# Patient Record
Sex: FEMALE | Race: WHITE | NOT HISPANIC OR LATINO | URBAN - METROPOLITAN AREA
[De-identification: names, ages, dates, MRNs, and addresses within clinical notes are randomized per-mention and may not be internally consistent; named-entity substitution may affect disease eponyms.]

---

## 2017-01-25 ENCOUNTER — INPATIENT (INPATIENT)
Facility: HOSPITAL | Age: 62
LOS: 4 days | Discharge: HOME | DRG: 312 | End: 2017-01-30
Attending: INTERNAL MEDICINE
Payer: MEDICARE

## 2017-01-25 VITALS
DIASTOLIC BLOOD PRESSURE: 78 MMHG | OXYGEN SATURATION: 93 % | RESPIRATION RATE: 18 BRPM | TEMPERATURE: 98 F | SYSTOLIC BLOOD PRESSURE: 160 MMHG | HEART RATE: 86 BPM

## 2017-01-25 DIAGNOSIS — F31.9 BIPOLAR DISORDER, UNSPECIFIED: ICD-10-CM

## 2017-01-25 DIAGNOSIS — R55 SYNCOPE AND COLLAPSE: ICD-10-CM

## 2017-01-25 DIAGNOSIS — E78.00 PURE HYPERCHOLESTEROLEMIA, UNSPECIFIED: ICD-10-CM

## 2017-01-25 DIAGNOSIS — I10 ESSENTIAL (PRIMARY) HYPERTENSION: ICD-10-CM

## 2017-01-25 PROBLEM — Z00.00 ENCOUNTER FOR PREVENTIVE HEALTH EXAMINATION: Status: ACTIVE | Noted: 2017-01-25

## 2017-01-25 LAB
ALBUMIN SERPL ELPH-MCNC: 3.9 G/DL — SIGNIFICANT CHANGE UP (ref 3.3–5.2)
ALP SERPL-CCNC: 69 U/L — SIGNIFICANT CHANGE UP (ref 40–120)
ALT FLD-CCNC: 31 U/L — SIGNIFICANT CHANGE UP
ANION GAP SERPL CALC-SCNC: 12 MMOL/L — SIGNIFICANT CHANGE UP (ref 5–17)
APPEARANCE UR: CLEAR — SIGNIFICANT CHANGE UP
APTT BLD: 27.9 SEC — SIGNIFICANT CHANGE UP (ref 27.5–37.4)
AST SERPL-CCNC: 31 U/L — SIGNIFICANT CHANGE UP
BASOPHILS # BLD AUTO: 0 K/UL — SIGNIFICANT CHANGE UP (ref 0–0.2)
BASOPHILS NFR BLD AUTO: 0.3 % — SIGNIFICANT CHANGE UP (ref 0–2)
BILIRUB SERPL-MCNC: 0.3 MG/DL — LOW (ref 0.4–2)
BILIRUB UR-MCNC: NEGATIVE — SIGNIFICANT CHANGE UP
BUN SERPL-MCNC: 19 MG/DL — SIGNIFICANT CHANGE UP (ref 8–20)
CALCIUM SERPL-MCNC: 9.8 MG/DL — SIGNIFICANT CHANGE UP (ref 8.6–10.2)
CHLORIDE SERPL-SCNC: 103 MMOL/L — SIGNIFICANT CHANGE UP (ref 98–107)
CK SERPL-CCNC: 63 U/L — SIGNIFICANT CHANGE UP (ref 25–170)
CO2 SERPL-SCNC: 29 MMOL/L — SIGNIFICANT CHANGE UP (ref 22–29)
COLOR SPEC: YELLOW — SIGNIFICANT CHANGE UP
CREAT SERPL-MCNC: 0.47 MG/DL — LOW (ref 0.5–1.3)
DIFF PNL FLD: NEGATIVE — SIGNIFICANT CHANGE UP
EOSINOPHIL # BLD AUTO: 0.2 K/UL — SIGNIFICANT CHANGE UP (ref 0–0.5)
EOSINOPHIL NFR BLD AUTO: 2.6 % — SIGNIFICANT CHANGE UP (ref 0–6)
EPI CELLS # UR: SIGNIFICANT CHANGE UP
GLUCOSE SERPL-MCNC: 92 MG/DL — SIGNIFICANT CHANGE UP (ref 70–115)
GLUCOSE UR QL: NEGATIVE MG/DL — SIGNIFICANT CHANGE UP
HCT VFR BLD CALC: 37.7 % — SIGNIFICANT CHANGE UP (ref 37–47)
HGB BLD-MCNC: 11.7 G/DL — LOW (ref 12–16)
INR BLD: 1.1 RATIO — SIGNIFICANT CHANGE UP (ref 0.88–1.16)
KETONES UR-MCNC: ABNORMAL
LEUKOCYTE ESTERASE UR-ACNC: NEGATIVE — SIGNIFICANT CHANGE UP
LYMPHOCYTES # BLD AUTO: 1.9 K/UL — SIGNIFICANT CHANGE UP (ref 1–4.8)
LYMPHOCYTES # BLD AUTO: 26 % — SIGNIFICANT CHANGE UP (ref 20–55)
MCHC RBC-ENTMCNC: 25.7 PG — LOW (ref 27–31)
MCHC RBC-ENTMCNC: 31 G/DL — LOW (ref 32–36)
MCV RBC AUTO: 82.7 FL — SIGNIFICANT CHANGE UP (ref 81–99)
MONOCYTES # BLD AUTO: 0.8 K/UL — SIGNIFICANT CHANGE UP (ref 0–0.8)
MONOCYTES NFR BLD AUTO: 11 % — HIGH (ref 3–10)
NEUTROPHILS # BLD AUTO: 4.3 K/UL — SIGNIFICANT CHANGE UP (ref 1.8–8)
NEUTROPHILS NFR BLD AUTO: 59.7 % — SIGNIFICANT CHANGE UP (ref 37–73)
NITRITE UR-MCNC: NEGATIVE — SIGNIFICANT CHANGE UP
PH UR: 5 — SIGNIFICANT CHANGE UP (ref 4.8–8)
PLATELET # BLD AUTO: 202 K/UL — SIGNIFICANT CHANGE UP (ref 150–400)
POTASSIUM SERPL-MCNC: 4.5 MMOL/L — SIGNIFICANT CHANGE UP (ref 3.5–5.3)
POTASSIUM SERPL-SCNC: 4.5 MMOL/L — SIGNIFICANT CHANGE UP (ref 3.5–5.3)
PROT SERPL-MCNC: 7.5 G/DL — SIGNIFICANT CHANGE UP (ref 6.6–8.7)
PROT UR-MCNC: 15 MG/DL
PROTHROM AB SERPL-ACNC: 12.1 SEC — SIGNIFICANT CHANGE UP (ref 10–13.1)
RBC # BLD: 4.56 M/UL — SIGNIFICANT CHANGE UP (ref 4.4–5.2)
RBC # FLD: 16.2 % — HIGH (ref 11–15.6)
SODIUM SERPL-SCNC: 144 MMOL/L — SIGNIFICANT CHANGE UP (ref 135–145)
SP GR SPEC: 1.02 — SIGNIFICANT CHANGE UP (ref 1.01–1.02)
TROPONIN T SERPL-MCNC: <0.01 NG/ML — SIGNIFICANT CHANGE UP (ref 0–0.06)
UROBILINOGEN FLD QL: 1 MG/DL
VALPROATE SERPL-MCNC: 69.9 UG/ML — SIGNIFICANT CHANGE UP (ref 50–100)
WBC # BLD: 7.18 K/UL — SIGNIFICANT CHANGE UP (ref 4.8–10.8)
WBC # FLD AUTO: 7.18 K/UL — SIGNIFICANT CHANGE UP (ref 4.8–10.8)
WBC UR QL: SIGNIFICANT CHANGE UP

## 2017-01-25 PROCEDURE — 99223 1ST HOSP IP/OBS HIGH 75: CPT | Mod: AI

## 2017-01-25 PROCEDURE — 73562 X-RAY EXAM OF KNEE 3: CPT | Mod: 26,50

## 2017-01-25 PROCEDURE — 70450 CT HEAD/BRAIN W/O DYE: CPT | Mod: 26

## 2017-01-25 PROCEDURE — 99285 EMERGENCY DEPT VISIT HI MDM: CPT

## 2017-01-25 PROCEDURE — 93010 ELECTROCARDIOGRAM REPORT: CPT

## 2017-01-25 PROCEDURE — 71010: CPT | Mod: 26

## 2017-01-25 RX ORDER — TIOTROPIUM BROMIDE 18 UG/1
1 CAPSULE ORAL; RESPIRATORY (INHALATION) DAILY
Qty: 0 | Refills: 0 | Status: DISCONTINUED | OUTPATIENT
Start: 2017-01-25 | End: 2017-01-30

## 2017-01-25 RX ORDER — ACETAMINOPHEN 500 MG
650 TABLET ORAL EVERY 8 HOURS
Qty: 0 | Refills: 0 | Status: DISCONTINUED | OUTPATIENT
Start: 2017-01-25 | End: 2017-01-30

## 2017-01-25 RX ORDER — FUROSEMIDE 40 MG
20 TABLET ORAL DAILY
Qty: 0 | Refills: 0 | Status: DISCONTINUED | OUTPATIENT
Start: 2017-01-25 | End: 2017-01-30

## 2017-01-25 RX ORDER — LORATADINE 10 MG/1
10 TABLET ORAL DAILY
Qty: 0 | Refills: 0 | Status: DISCONTINUED | OUTPATIENT
Start: 2017-01-25 | End: 2017-01-30

## 2017-01-25 RX ORDER — SERTRALINE 25 MG/1
100 TABLET, FILM COATED ORAL DAILY
Qty: 0 | Refills: 0 | Status: DISCONTINUED | OUTPATIENT
Start: 2017-01-25 | End: 2017-01-30

## 2017-01-25 RX ORDER — SODIUM CHLORIDE 9 MG/ML
3 INJECTION INTRAMUSCULAR; INTRAVENOUS; SUBCUTANEOUS ONCE
Qty: 0 | Refills: 0 | Status: COMPLETED | OUTPATIENT
Start: 2017-01-25 | End: 2017-01-25

## 2017-01-25 RX ORDER — SODIUM CHLORIDE 9 MG/ML
1000 INJECTION INTRAMUSCULAR; INTRAVENOUS; SUBCUTANEOUS
Qty: 0 | Refills: 0 | Status: COMPLETED | OUTPATIENT
Start: 2017-01-25 | End: 2017-01-25

## 2017-01-25 RX ORDER — HEPARIN SODIUM 5000 [USP'U]/ML
5000 INJECTION INTRAVENOUS; SUBCUTANEOUS EVERY 12 HOURS
Qty: 0 | Refills: 0 | Status: DISCONTINUED | OUTPATIENT
Start: 2017-01-25 | End: 2017-01-28

## 2017-01-25 RX ORDER — RISPERIDONE 4 MG/1
1 TABLET ORAL AT BEDTIME
Qty: 0 | Refills: 0 | Status: DISCONTINUED | OUTPATIENT
Start: 2017-01-25 | End: 2017-01-30

## 2017-01-25 RX ORDER — BENZTROPINE MESYLATE 1 MG
0.5 TABLET ORAL
Qty: 0 | Refills: 0 | Status: DISCONTINUED | OUTPATIENT
Start: 2017-01-25 | End: 2017-01-30

## 2017-01-25 RX ORDER — AMLODIPINE BESYLATE 2.5 MG/1
5 TABLET ORAL DAILY
Qty: 0 | Refills: 0 | Status: DISCONTINUED | OUTPATIENT
Start: 2017-01-25 | End: 2017-01-30

## 2017-01-25 RX ORDER — OXYBUTYNIN CHLORIDE 5 MG
10 TABLET ORAL DAILY
Qty: 0 | Refills: 0 | Status: DISCONTINUED | OUTPATIENT
Start: 2017-01-25 | End: 2017-01-30

## 2017-01-25 RX ORDER — ALBUTEROL 90 UG/1
2 AEROSOL, METERED ORAL EVERY 6 HOURS
Qty: 0 | Refills: 0 | Status: DISCONTINUED | OUTPATIENT
Start: 2017-01-25 | End: 2017-01-30

## 2017-01-25 RX ORDER — ACETAMINOPHEN 500 MG
650 TABLET ORAL EVERY 6 HOURS
Qty: 0 | Refills: 0 | Status: DISCONTINUED | OUTPATIENT
Start: 2017-01-25 | End: 2017-01-30

## 2017-01-25 RX ORDER — DIVALPROEX SODIUM 500 MG/1
500 TABLET, DELAYED RELEASE ORAL
Qty: 0 | Refills: 0 | Status: DISCONTINUED | OUTPATIENT
Start: 2017-01-25 | End: 2017-01-30

## 2017-01-25 RX ORDER — LEVETIRACETAM 250 MG/1
500 TABLET, FILM COATED ORAL
Qty: 0 | Refills: 0 | Status: DISCONTINUED | OUTPATIENT
Start: 2017-01-25 | End: 2017-01-30

## 2017-01-25 RX ADMIN — SODIUM CHLORIDE 3 MILLILITER(S): 9 INJECTION INTRAMUSCULAR; INTRAVENOUS; SUBCUTANEOUS at 12:53

## 2017-01-25 RX ADMIN — SODIUM CHLORIDE 125 MILLILITER(S): 9 INJECTION INTRAMUSCULAR; INTRAVENOUS; SUBCUTANEOUS at 12:54

## 2017-01-25 RX ADMIN — RISPERIDONE 1 MILLIGRAM(S): 4 TABLET ORAL at 23:15

## 2017-01-25 NOTE — ED ADULT NURSE NOTE - OBJECTIVE STATEMENT
pt YEVGENIY from [facility]. Pt with a c/o multiple falls at the residence, pt denies head injury or loc. pt A&Ox3 able to ambulate and skin intact. Iv placed and labs obtained. will continue to monitor.

## 2017-01-25 NOTE — ED PROVIDER NOTE - OBJECTIVE STATEMENT
60 y/o female in ED c/o feeling lightheaded, dizzy and weak leading to fall at NH today.  as per EMS, pt found on floor.  pt denies any LOC, head trauma, neck pain, cp, sob, n/v/d/abd pain.  no sick contacts or recent travel - poor historian

## 2017-01-25 NOTE — ED ADULT NURSE NOTE - CHIEF COMPLAINT QUOTE
pt comes to ed from [place] for eval of multiple falls over the last week. denies hitting head/loc. unk. meds

## 2017-01-25 NOTE — H&P ADULT. - HISTORY OF PRESENT ILLNESS
61 yr old female with hypertension, hyperlipidemia, bipolar was sent from LTC for weakness and fall. Patient states she uses a cane and occasionally a walker and has been falling lately. Not a good historian. States she had occasional dizziness. Denies chest pain, shortness of breath, headache. States she fell today on her knees, no LOC or head trauma. Has been in LTC for 8 months. Had vomiting 2 days ago, now resolved. No bladder/bowel complaints. Normal appetite.

## 2017-01-25 NOTE — ED ADULT TRIAGE NOTE - CHIEF COMPLAINT QUOTE
pt comes to ed from [place] for eval of multiple falls over the last week. denies hitting head/loc. unk. meds

## 2017-01-26 LAB
ANION GAP SERPL CALC-SCNC: 14 MMOL/L — SIGNIFICANT CHANGE UP (ref 5–17)
BASOPHILS # BLD AUTO: 0 K/UL — SIGNIFICANT CHANGE UP (ref 0–0.2)
BASOPHILS NFR BLD AUTO: 0.1 % — SIGNIFICANT CHANGE UP (ref 0–2)
BUN SERPL-MCNC: 22 MG/DL — HIGH (ref 8–20)
CALCIUM SERPL-MCNC: 9.4 MG/DL — SIGNIFICANT CHANGE UP (ref 8.6–10.2)
CHLORIDE SERPL-SCNC: 102 MMOL/L — SIGNIFICANT CHANGE UP (ref 98–107)
CO2 SERPL-SCNC: 25 MMOL/L — SIGNIFICANT CHANGE UP (ref 22–29)
CREAT SERPL-MCNC: 0.43 MG/DL — LOW (ref 0.5–1.3)
EOSINOPHIL # BLD AUTO: 0.2 K/UL — SIGNIFICANT CHANGE UP (ref 0–0.5)
EOSINOPHIL NFR BLD AUTO: 3.4 % — SIGNIFICANT CHANGE UP (ref 0–6)
GLUCOSE SERPL-MCNC: 90 MG/DL — SIGNIFICANT CHANGE UP (ref 70–115)
HCT VFR BLD CALC: 35.3 % — LOW (ref 37–47)
HGB BLD-MCNC: 11 G/DL — LOW (ref 12–16)
INR BLD: 1.11 RATIO — SIGNIFICANT CHANGE UP (ref 0.88–1.16)
LYMPHOCYTES # BLD AUTO: 2.7 K/UL — SIGNIFICANT CHANGE UP (ref 1–4.8)
LYMPHOCYTES # BLD AUTO: 36.9 % — SIGNIFICANT CHANGE UP (ref 20–55)
MAGNESIUM SERPL-MCNC: 2 MG/DL — SIGNIFICANT CHANGE UP (ref 1.8–2.5)
MCHC RBC-ENTMCNC: 25.6 PG — LOW (ref 27–31)
MCHC RBC-ENTMCNC: 31.2 G/DL — LOW (ref 32–36)
MCV RBC AUTO: 82.3 FL — SIGNIFICANT CHANGE UP (ref 81–99)
MONOCYTES # BLD AUTO: 0.8 K/UL — SIGNIFICANT CHANGE UP (ref 0–0.8)
MONOCYTES NFR BLD AUTO: 10.3 % — HIGH (ref 3–10)
NEUTROPHILS # BLD AUTO: 3.6 K/UL — SIGNIFICANT CHANGE UP (ref 1.8–8)
NEUTROPHILS NFR BLD AUTO: 48.6 % — SIGNIFICANT CHANGE UP (ref 37–73)
PHOSPHATE SERPL-MCNC: 3.5 MG/DL — SIGNIFICANT CHANGE UP (ref 2.4–4.7)
PLATELET # BLD AUTO: 198 K/UL — SIGNIFICANT CHANGE UP (ref 150–400)
POTASSIUM SERPL-MCNC: 3.9 MMOL/L — SIGNIFICANT CHANGE UP (ref 3.5–5.3)
POTASSIUM SERPL-SCNC: 3.9 MMOL/L — SIGNIFICANT CHANGE UP (ref 3.5–5.3)
PROTHROM AB SERPL-ACNC: 12.2 SEC — SIGNIFICANT CHANGE UP (ref 10–13.1)
RBC # BLD: 4.29 M/UL — LOW (ref 4.4–5.2)
RBC # FLD: 16.1 % — HIGH (ref 11–15.6)
SODIUM SERPL-SCNC: 141 MMOL/L — SIGNIFICANT CHANGE UP (ref 135–145)
WBC # BLD: 7.35 K/UL — SIGNIFICANT CHANGE UP (ref 4.8–10.8)
WBC # FLD AUTO: 7.35 K/UL — SIGNIFICANT CHANGE UP (ref 4.8–10.8)

## 2017-01-26 PROCEDURE — 99232 SBSQ HOSP IP/OBS MODERATE 35: CPT

## 2017-01-26 RX ADMIN — DIVALPROEX SODIUM 500 MILLIGRAM(S): 500 TABLET, DELAYED RELEASE ORAL at 18:22

## 2017-01-26 RX ADMIN — ALBUTEROL 2 PUFF(S): 90 AEROSOL, METERED ORAL at 02:58

## 2017-01-26 RX ADMIN — Medication 0.5 MILLIGRAM(S): at 05:24

## 2017-01-26 RX ADMIN — RISPERIDONE 1 MILLIGRAM(S): 4 TABLET ORAL at 22:03

## 2017-01-26 RX ADMIN — ALBUTEROL 2 PUFF(S): 90 AEROSOL, METERED ORAL at 10:19

## 2017-01-26 RX ADMIN — Medication 20 MILLIGRAM(S): at 05:25

## 2017-01-26 RX ADMIN — AMLODIPINE BESYLATE 5 MILLIGRAM(S): 2.5 TABLET ORAL at 05:25

## 2017-01-26 RX ADMIN — SERTRALINE 100 MILLIGRAM(S): 25 TABLET, FILM COATED ORAL at 13:56

## 2017-01-26 RX ADMIN — Medication 1 APPLICATION(S): at 05:25

## 2017-01-26 RX ADMIN — Medication 0.5 MILLIGRAM(S): at 18:22

## 2017-01-26 RX ADMIN — LEVETIRACETAM 500 MILLIGRAM(S): 250 TABLET, FILM COATED ORAL at 05:24

## 2017-01-26 RX ADMIN — Medication 1 APPLICATION(S): at 22:03

## 2017-01-26 RX ADMIN — Medication 10 MILLIGRAM(S): at 13:58

## 2017-01-26 RX ADMIN — Medication 10 MILLIGRAM(S): at 05:25

## 2017-01-26 RX ADMIN — HEPARIN SODIUM 5000 UNIT(S): 5000 INJECTION INTRAVENOUS; SUBCUTANEOUS at 18:24

## 2017-01-26 RX ADMIN — TIOTROPIUM BROMIDE 1 CAPSULE(S): 18 CAPSULE ORAL; RESPIRATORY (INHALATION) at 10:18

## 2017-01-26 RX ADMIN — HEPARIN SODIUM 5000 UNIT(S): 5000 INJECTION INTRAVENOUS; SUBCUTANEOUS at 05:25

## 2017-01-26 RX ADMIN — LORATADINE 10 MILLIGRAM(S): 10 TABLET ORAL at 13:56

## 2017-01-26 RX ADMIN — DIVALPROEX SODIUM 500 MILLIGRAM(S): 500 TABLET, DELAYED RELEASE ORAL at 05:25

## 2017-01-26 RX ADMIN — LEVETIRACETAM 500 MILLIGRAM(S): 250 TABLET, FILM COATED ORAL at 18:22

## 2017-01-26 NOTE — PHYSICAL THERAPY INITIAL EVALUATION ADULT - ADDITIONAL COMMENTS
Pt is a poor historian, reports living in an Adult Home, no steps.  PTA, reports amb with a SAC, and requiring assist with dressing and showering.

## 2017-01-26 NOTE — PROGRESS NOTE ADULT - SUBJECTIVE AND OBJECTIVE BOX
INTERVAL HPI/OVERNIGHT EVENTS:      CC: fall, hypertension, bipolar disorder    No overnight events, denies complaints.     Vital Signs Last 24 Hrs  T(C): 36.9, Max: 36.9 ( @ 10:05)  T(F): 98.4, Max: 98.4 ( @ 10:05)  HR: 85 (76 - 85)  BP: 130/80 (130/80 - 152/74)  BP(mean): --  RR: 18 (16 - 20)  SpO2: 96% (95% - 100%)    PHYSICAL EXAM:    GENERAL: Not in distress  CHEST/LUNG: b/l air entry  HEART: Regular   ABDOMEN: Soft, BS +  EXTREMITIES:  no edema    MEDICATIONS  (STANDING):  sodium chloride 0.9%. 1000milliLiter(s) IV Continuous <Continuous>  heparin  Injectable 5000Unit(s) SubCutaneous every 12 hours  enalapril 10milliGRAM(s) Oral daily  diVALproex ER 500milliGRAM(s) Oral two times a day  levETIRAcetam 500milliGRAM(s) Oral two times a day  sertraline 100milliGRAM(s) Oral daily  loratadine 10milliGRAM(s) Oral daily  benztropine 0.5milliGRAM(s) Oral two times a day  risperiDONE   Tablet 1milliGRAM(s) Oral at bedtime  ALBUTerol    90 MICROgram(s) HFA Inhaler 2Puff(s) Inhalation every 6 hours  tiotropium 18 MICROgram(s) Capsule 1Capsule(s) Inhalation daily  amLODIPine   Tablet 5milliGRAM(s) Oral daily  clotrimazole 1% Cream 1Application(s) Topical two times a day  furosemide    Tablet 20milliGRAM(s) Oral daily  oxybutynin 10milliGRAM(s) Oral daily    MEDICATIONS  (PRN):  acetaminophen   Tablet 650milliGRAM(s) Oral every 8 hours PRN For Temp greater than 38 C (100.4 F)  acetaminophen   Tablet. 650milliGRAM(s) Oral every 6 hours PRN Moderate Pain (4 - 6)      Allergies    No Known Allergies    Intolerances          LABS:                          11.0   7.35  )-----------( 198      ( 2017 05:43 )             35.3     2017 05:43    141    |  102    |  22.0   ----------------------------<  90     3.9     |  25.0   |  0.43     Ca    9.4        2017 05:43  Phos  3.5       2017 05:43  Mg     2.0       2017 05:43    TPro  7.5    /  Alb  3.9    /  TBili  0.3    /  DBili  x      /  AST  31     /  ALT  31     /  AlkPhos  69     2017 12:48    PT/INR - ( 2017 05:43 )   PT: 12.2 sec;   INR: 1.11 ratio         PTT - ( 2017 12:48 )  PTT:27.9 sec  Urinalysis Basic - ( 2017 12:46 )    Color: Yellow / Appearance: Clear / S.025 / pH: x  Gluc: x / Ketone: Trace  / Bili: Negative / Urobili: 1 mg/dL   Blood: x / Protein: 15 mg/dL / Nitrite: Negative   Leuk Esterase: Negative / RBC: x / WBC 0-2   Sq Epi: x / Non Sq Epi: Occasional / Bacteria: x        RADIOLOGY & ADDITIONAL TESTS:

## 2017-01-26 NOTE — PHYSICAL THERAPY INITIAL EVALUATION ADULT - IMPAIRMENTS FOUND, PT EVAL
muscle strength/gait, locomotion, and balance/gross motor/arousal, attention, and cognition/anthropometric characteristics

## 2017-01-26 NOTE — PROGRESS NOTE ADULT - ASSESSMENT
81 yr old female with hypertension, hyperlipidemia, bipolar was sent from LTC for weakness and fall. Labs and imaging reviewed. She has severe arthritis of both knees.

## 2017-01-27 PROCEDURE — 99232 SBSQ HOSP IP/OBS MODERATE 35: CPT

## 2017-01-27 RX ADMIN — HEPARIN SODIUM 5000 UNIT(S): 5000 INJECTION INTRAVENOUS; SUBCUTANEOUS at 05:54

## 2017-01-27 RX ADMIN — AMLODIPINE BESYLATE 5 MILLIGRAM(S): 2.5 TABLET ORAL at 05:53

## 2017-01-27 RX ADMIN — Medication 0.5 MILLIGRAM(S): at 19:05

## 2017-01-27 RX ADMIN — Medication 10 MILLIGRAM(S): at 05:53

## 2017-01-27 RX ADMIN — DIVALPROEX SODIUM 500 MILLIGRAM(S): 500 TABLET, DELAYED RELEASE ORAL at 19:03

## 2017-01-27 RX ADMIN — Medication 20 MILLIGRAM(S): at 05:53

## 2017-01-27 RX ADMIN — ALBUTEROL 2 PUFF(S): 90 AEROSOL, METERED ORAL at 22:25

## 2017-01-27 RX ADMIN — Medication 1 APPLICATION(S): at 05:57

## 2017-01-27 RX ADMIN — LEVETIRACETAM 500 MILLIGRAM(S): 250 TABLET, FILM COATED ORAL at 19:03

## 2017-01-27 RX ADMIN — Medication 10 MILLIGRAM(S): at 12:34

## 2017-01-27 RX ADMIN — LORATADINE 10 MILLIGRAM(S): 10 TABLET ORAL at 12:34

## 2017-01-27 RX ADMIN — Medication 1 APPLICATION(S): at 19:04

## 2017-01-27 RX ADMIN — TIOTROPIUM BROMIDE 1 CAPSULE(S): 18 CAPSULE ORAL; RESPIRATORY (INHALATION) at 09:12

## 2017-01-27 RX ADMIN — DIVALPROEX SODIUM 500 MILLIGRAM(S): 500 TABLET, DELAYED RELEASE ORAL at 05:53

## 2017-01-27 RX ADMIN — ALBUTEROL 2 PUFF(S): 90 AEROSOL, METERED ORAL at 16:23

## 2017-01-27 RX ADMIN — ALBUTEROL 2 PUFF(S): 90 AEROSOL, METERED ORAL at 03:58

## 2017-01-27 RX ADMIN — ALBUTEROL 2 PUFF(S): 90 AEROSOL, METERED ORAL at 09:11

## 2017-01-27 RX ADMIN — SERTRALINE 100 MILLIGRAM(S): 25 TABLET, FILM COATED ORAL at 12:34

## 2017-01-27 RX ADMIN — RISPERIDONE 1 MILLIGRAM(S): 4 TABLET ORAL at 23:49

## 2017-01-27 RX ADMIN — LEVETIRACETAM 500 MILLIGRAM(S): 250 TABLET, FILM COATED ORAL at 05:53

## 2017-01-27 RX ADMIN — Medication 0.5 MILLIGRAM(S): at 05:53

## 2017-01-27 RX ADMIN — HEPARIN SODIUM 5000 UNIT(S): 5000 INJECTION INTRAVENOUS; SUBCUTANEOUS at 19:04

## 2017-01-27 NOTE — PROGRESS NOTE ADULT - SUBJECTIVE AND OBJECTIVE BOX
INTERVAL HPI/OVERNIGHT EVENTS:      CC: Falls, hypertension, bipolar    No overnight events, denies complaints. Evaluated by SAMEER.     Vital Signs Last 24 Hrs  T(C): 37, Max: 37 ( @ 16:30)  T(F): 98.6, Max: 98.6 ( @ 16:30)  HR: 76 (76 - 86)  BP: 145/82 (123/70 - 145/82)  BP(mean): --  RR: 20 (18 - 20)  SpO2: 94% (94% - 95%)    PHYSICAL EXAM:    GENERAL: alert, in no distress  CHEST/LUNG: b/l air entry  HEART: regular  ABDOMEN: soft, BS+  EXTREMITIES:  No edema    MEDICATIONS  (STANDING):  sodium chloride 0.9%. 1000milliLiter(s) IV Continuous <Continuous>  heparin  Injectable 5000Unit(s) SubCutaneous every 12 hours  enalapril 10milliGRAM(s) Oral daily  diVALproex ER 500milliGRAM(s) Oral two times a day  levETIRAcetam 500milliGRAM(s) Oral two times a day  sertraline 100milliGRAM(s) Oral daily  loratadine 10milliGRAM(s) Oral daily  benztropine 0.5milliGRAM(s) Oral two times a day  risperiDONE   Tablet 1milliGRAM(s) Oral at bedtime  ALBUTerol    90 MICROgram(s) HFA Inhaler 2Puff(s) Inhalation every 6 hours  tiotropium 18 MICROgram(s) Capsule 1Capsule(s) Inhalation daily  amLODIPine   Tablet 5milliGRAM(s) Oral daily  clotrimazole 1% Cream 1Application(s) Topical two times a day  furosemide    Tablet 20milliGRAM(s) Oral daily  oxybutynin 10milliGRAM(s) Oral daily    MEDICATIONS  (PRN):  acetaminophen   Tablet 650milliGRAM(s) Oral every 8 hours PRN For Temp greater than 38 C (100.4 F)  acetaminophen   Tablet. 650milliGRAM(s) Oral every 6 hours PRN Moderate Pain (4 - 6)      Allergies    No Known Allergies    Intolerances          LABS:                          11.0   7.35  )-----------( 198      ( 2017 05:43 )             35.3     2017 05:43    141    |  102    |  22.0   ----------------------------<  90     3.9     |  25.0   |  0.43     Ca    9.4        2017 05:43  Phos  3.5       2017 05:43  Mg     2.0       2017 05:43    TPro  7.5    /  Alb  3.9    /  TBili  0.3    /  DBili  x      /  AST  31     /  ALT  31     /  AlkPhos  69     2017 12:48    PT/INR - ( 2017 05:43 )   PT: 12.2 sec;   INR: 1.11 ratio         PTT - ( 2017 12:48 )  PTT:27.9 sec  Urinalysis Basic - ( 2017 12:46 )    Color: Yellow / Appearance: Clear / S.025 / pH: x  Gluc: x / Ketone: Trace  / Bili: Negative / Urobili: 1 mg/dL   Blood: x / Protein: 15 mg/dL / Nitrite: Negative   Leuk Esterase: Negative / RBC: x / WBC 0-2   Sq Epi: x / Non Sq Epi: Occasional / Bacteria: x        RADIOLOGY & ADDITIONAL TESTS:

## 2017-01-27 NOTE — DISCHARGE NOTE ADULT - PROVIDER TOKENS
FREE:[LAST:[PMD],PHONE:[(   )    -],FAX:[(   )    -]] FREE:[LAST:[PMD [facility]],PHONE:[(   )    -],FAX:[(   )    -],ADDRESS:[[facility] [phone] located on [address] [place] [address].]]

## 2017-01-27 NOTE — DISCHARGE NOTE ADULT - CARE PLAN
Principal Discharge DX:	Near syncope  Goal:	Avoid recurrent episodes.  Instructions for follow-up, activity and diet:	SAMEER, pain control for arthritis.  Secondary Diagnosis:	Bipolar 1 disorder  Instructions for follow-up, activity and diet:	Continue medications.  Secondary Diagnosis:	High cholesterol  Instructions for follow-up, activity and diet:	Continue statin  Secondary Diagnosis:	HTN (hypertension)  Instructions for follow-up, activity and diet:	Continue medications. Principal Discharge DX:	Near syncope  Goal:	Avoid recurrent episodes.  Instructions for follow-up, activity and diet:	SAMEER, pain control for arthritis.  Secondary Diagnosis:	Bipolar 1 disorder  Instructions for follow-up, activity and diet:	Continue medications.  Secondary Diagnosis:	High cholesterol  Instructions for follow-up, activity and diet:	Continue statin  Secondary Diagnosis:	HTN (hypertension)  Instructions for follow-up, activity and diet:	Continue medications.  Secondary Diagnosis:	Other secondary osteoarthritis of both knees

## 2017-01-27 NOTE — DISCHARGE NOTE ADULT - HOSPITAL COURSE
81 yr old female with hypertension, hyperlipidemia, bipolar was sent from LTC for weakness and fall. Labs and imaging reviewed. She has severe arthritis of both knees. PT evaluation was done, advised SAMEER. Discussed with patient, in agreement. Social work evaluation. 81 yr old female with hypertension, hyperlipidemia, bipolar was sent from LT for weakness and fall. Labs and imaging reviewed. She has severe arthritis of both knees. PT evaluation was done, advised SAMEER. Medically stable and agreeable with discharge and follow up plan. Patient was advised to return to ED if any symptoms occur or worsen.

## 2017-01-27 NOTE — DISCHARGE NOTE ADULT - PATIENT PORTAL LINK FT
“You can access the Follow[facility] Patient Portal, offered by [facility], by registering with the following website: [email or web address]”

## 2017-01-27 NOTE — DISCHARGE NOTE ADULT - MEDICATION SUMMARY - MEDICATIONS TO TAKE
I will START or STAY ON the medications listed below when I get home from the hospital:    Tylenol 325 mg oral tablet  -- 1 tab(s) by mouth 3 times a day, As Needed  -- Indication: For pain    enalapril 5 mg oral tablet  -- 2 tab(s) by mouth once a day  -- Indication: For Hypertension    Depakote  mg oral tablet, extended release  -- 2 tab(s) by mouth 2 times a day  -- Indication: For Bipolar 1 disorder    Keppra 500 mg oral tablet  -- 1 tab(s) by mouth 2 times a day  -- Indication: For Seizure    Zoloft 100 mg oral tablet  -- 1 tab(s) by mouth once a day  -- Indication: For Bipolar 1 disorder    loratadine 10 mg oral tablet  -- 1 tab(s) by mouth once a day  -- Indication: For allergy    Pravachol 40 mg oral tablet  -- 1 tab(s) by mouth once a day  -- Indication: For High cholesterol    benztropine 0.5 mg oral tablet  -- 1 tab(s) by mouth 2 times a day  -- Indication: For Bipolar 1 disorder    risperiDONE 1 mg oral tablet  -- 1 tab(s) by mouth once a day (at bedtime)  -- Indication: For Bipolar 1 disorder    Invega Sustenna 234 mg/1.5 mL intramuscular suspension, extended release  -- 234 milligram(s) intramuscular every 30 days  -- Indication: For Bipolar 1 disorder    Combivent Respimat CFC free 20 mcg-100 mcg/inh inhalation aerosol  -- 1 puff(s) inhaled 4 times a day  -- Indication: For Shortness of breath    amLODIPine 5 mg oral tablet  -- 1 tab(s) by mouth once a day  -- Indication: For Hypertension    clotrimazole 1% topical cream  -- Apply on skin to affected area 2 times a day  -- Indication: For rash    furosemide 20 mg oral tablet  -- 1 tab(s) by mouth once a day  -- Indication: For HTN (hypertension)    famotidine 20 mg oral tablet  -- 1 tab(s) by mouth 2 times a day  -- Indication: For reflux    Ditropan XL 10 mg/24 hr oral tablet, extended release  -- 1 tab(s) by mouth once a day  -- Indication: For urinary retention I will START or STAY ON the medications listed below when I get home from the hospital:    Tylenol 325 mg oral tablet  -- 1 tab(s) by mouth 3 times a day, As Needed  -- Indication: For pain    traMADol 50 mg oral tablet  -- 1 tab(s) by mouth 3 times a day, As Needed  -- Indication: For mod pain    enalapril 5 mg oral tablet  -- 2 tab(s) by mouth once a day  -- Indication: For Hypertension    Depakote  mg oral tablet, extended release  -- 2 tab(s) by mouth 2 times a day  -- Indication: For Bipolar 1 disorder    Keppra 500 mg oral tablet  -- 1 tab(s) by mouth 2 times a day  -- Indication: For Seizure    Zoloft 100 mg oral tablet  -- 1 tab(s) by mouth once a day  -- Indication: For Bipolar 1 disorder    loratadine 10 mg oral tablet  -- 1 tab(s) by mouth once a day  -- Indication: For allergy    Pravachol 40 mg oral tablet  -- 1 tab(s) by mouth once a day  -- Indication: For High cholesterol    benztropine 0.5 mg oral tablet  -- 1 tab(s) by mouth 2 times a day  -- Indication: For Bipolar 1 disorder    risperiDONE 1 mg oral tablet  -- 1 tab(s) by mouth once a day (at bedtime)  -- Indication: For Bipolar 1 disorder    Invega Sustenna 234 mg/1.5 mL intramuscular suspension, extended release  -- 234 milligram(s) intramuscular every 30 days  -- Indication: For Bipolar 1 disorder    Combivent Respimat CFC free 20 mcg-100 mcg/inh inhalation aerosol  -- 1 puff(s) inhaled 4 times a day  -- Indication: For Shortness of breath    amLODIPine 5 mg oral tablet  -- 1 tab(s) by mouth once a day  -- Indication: For Hypertension    clotrimazole 1% topical cream  -- Apply on skin to affected area 2 times a day  -- Indication: For rash    lidocaine 5% topical film  -- Apply on skin to affected area once a day  -- Indication: For pain    furosemide 20 mg oral tablet  -- 1 tab(s) by mouth once a day  -- Indication: For HTN (hypertension)    famotidine 20 mg oral tablet  -- 1 tab(s) by mouth 2 times a day  -- Indication: For reflux    polyethylene glycol 3350 oral powder for reconstitution  -- 17 gram(s) by mouth once a day  -- Indication: For Bowelr egimen    senna oral tablet  -- 2 tab(s) by mouth once a day (at bedtime)  -- Indication: For Bowel regimen    pantoprazole 40 mg oral delayed release tablet  -- 1 tab(s) by mouth once a day (before a meal)  -- Indication: For gerd    Ditropan XL 10 mg/24 hr oral tablet, extended release  -- 1 tab(s) by mouth once a day  -- Indication: For urinary retention

## 2017-01-27 NOTE — DISCHARGE NOTE ADULT - CARE PROVIDER_API CALL
BORA,   Phone: (   )    -  Fax: (   )    - PMD [facility],   [facility] [phone] located on [address] [place] [address], [place] [address].  Phone: (   )    -  Fax: (   )    -

## 2017-01-27 NOTE — PROGRESS NOTE ADULT - ASSESSMENT
81 yr old female with hypertension, hyperlipidemia, bipolar was sent from LTC for weakness and fall. Labs and imaging reviewed. She has severe arthritis of both knees. PT evaluation was done, advised SAMEER. Discussed with patient, in agreement. Social work evaluation.

## 2017-01-28 PROCEDURE — 99233 SBSQ HOSP IP/OBS HIGH 50: CPT

## 2017-01-28 RX ORDER — RISPERIDONE 4 MG/1
1 TABLET ORAL
Qty: 0 | Refills: 0 | COMMUNITY

## 2017-01-28 RX ORDER — DEXTROSE 50 % IN WATER 50 %
12.5 SYRINGE (ML) INTRAVENOUS ONCE
Qty: 0 | Refills: 0 | Status: DISCONTINUED | OUTPATIENT
Start: 2017-01-28 | End: 2017-01-30

## 2017-01-28 RX ORDER — ACETAMINOPHEN 500 MG
1 TABLET ORAL
Qty: 0 | Refills: 0 | COMMUNITY

## 2017-01-28 RX ORDER — SENNA PLUS 8.6 MG/1
2 TABLET ORAL AT BEDTIME
Qty: 0 | Refills: 0 | Status: DISCONTINUED | OUTPATIENT
Start: 2017-01-28 | End: 2017-01-30

## 2017-01-28 RX ORDER — LORATADINE 10 MG/1
1 TABLET ORAL
Qty: 0 | Refills: 0 | COMMUNITY

## 2017-01-28 RX ORDER — SERTRALINE 25 MG/1
1 TABLET, FILM COATED ORAL
Qty: 0 | Refills: 0 | COMMUNITY

## 2017-01-28 RX ORDER — SODIUM CHLORIDE 9 MG/ML
1000 INJECTION, SOLUTION INTRAVENOUS
Qty: 0 | Refills: 0 | Status: DISCONTINUED | OUTPATIENT
Start: 2017-01-28 | End: 2017-01-30

## 2017-01-28 RX ORDER — LEVETIRACETAM 250 MG/1
1 TABLET, FILM COATED ORAL
Qty: 0 | Refills: 0 | COMMUNITY

## 2017-01-28 RX ORDER — PALIPERIDONE 1.5 MG/1
234 TABLET, EXTENDED RELEASE ORAL
Qty: 0 | Refills: 0 | COMMUNITY

## 2017-01-28 RX ORDER — DEXTROSE 50 % IN WATER 50 %
25 SYRINGE (ML) INTRAVENOUS ONCE
Qty: 0 | Refills: 0 | Status: DISCONTINUED | OUTPATIENT
Start: 2017-01-28 | End: 2017-01-30

## 2017-01-28 RX ORDER — FUROSEMIDE 40 MG
1 TABLET ORAL
Qty: 0 | Refills: 0 | COMMUNITY

## 2017-01-28 RX ORDER — OXYBUTYNIN CHLORIDE 5 MG
1 TABLET ORAL
Qty: 0 | Refills: 0 | COMMUNITY

## 2017-01-28 RX ORDER — GLUCAGON INJECTION, SOLUTION 0.5 MG/.1ML
1 INJECTION, SOLUTION SUBCUTANEOUS ONCE
Qty: 0 | Refills: 0 | Status: DISCONTINUED | OUTPATIENT
Start: 2017-01-28 | End: 2017-01-30

## 2017-01-28 RX ORDER — HEPARIN SODIUM 5000 [USP'U]/ML
5000 INJECTION INTRAVENOUS; SUBCUTANEOUS EVERY 8 HOURS
Qty: 0 | Refills: 0 | Status: DISCONTINUED | OUTPATIENT
Start: 2017-01-28 | End: 2017-01-30

## 2017-01-28 RX ORDER — POLYETHYLENE GLYCOL 3350 17 G/17G
17 POWDER, FOR SOLUTION ORAL DAILY
Qty: 0 | Refills: 0 | Status: DISCONTINUED | OUTPATIENT
Start: 2017-01-28 | End: 2017-01-30

## 2017-01-28 RX ORDER — FAMOTIDINE 10 MG/ML
1 INJECTION INTRAVENOUS
Qty: 0 | Refills: 0 | COMMUNITY

## 2017-01-28 RX ORDER — DEXTROSE 50 % IN WATER 50 %
1 SYRINGE (ML) INTRAVENOUS ONCE
Qty: 0 | Refills: 0 | Status: DISCONTINUED | OUTPATIENT
Start: 2017-01-28 | End: 2017-01-30

## 2017-01-28 RX ORDER — IPRATROPIUM/ALBUTEROL SULFATE 18-103MCG
1 AEROSOL WITH ADAPTER (GRAM) INHALATION
Qty: 0 | Refills: 0 | COMMUNITY

## 2017-01-28 RX ORDER — DIVALPROEX SODIUM 500 MG/1
2 TABLET, DELAYED RELEASE ORAL
Qty: 0 | Refills: 0 | COMMUNITY

## 2017-01-28 RX ORDER — BENZTROPINE MESYLATE 1 MG
1 TABLET ORAL
Qty: 0 | Refills: 0 | COMMUNITY

## 2017-01-28 RX ADMIN — SENNA PLUS 2 TABLET(S): 8.6 TABLET ORAL at 22:25

## 2017-01-28 RX ADMIN — LEVETIRACETAM 500 MILLIGRAM(S): 250 TABLET, FILM COATED ORAL at 17:20

## 2017-01-28 RX ADMIN — Medication 1 APPLICATION(S): at 17:20

## 2017-01-28 RX ADMIN — Medication 10 MILLIGRAM(S): at 05:46

## 2017-01-28 RX ADMIN — ALBUTEROL 2 PUFF(S): 90 AEROSOL, METERED ORAL at 21:52

## 2017-01-28 RX ADMIN — HEPARIN SODIUM 5000 UNIT(S): 5000 INJECTION INTRAVENOUS; SUBCUTANEOUS at 21:26

## 2017-01-28 RX ADMIN — POLYETHYLENE GLYCOL 3350 17 GRAM(S): 17 POWDER, FOR SOLUTION ORAL at 17:20

## 2017-01-28 RX ADMIN — SERTRALINE 100 MILLIGRAM(S): 25 TABLET, FILM COATED ORAL at 12:07

## 2017-01-28 RX ADMIN — LEVETIRACETAM 500 MILLIGRAM(S): 250 TABLET, FILM COATED ORAL at 05:46

## 2017-01-28 RX ADMIN — LORATADINE 10 MILLIGRAM(S): 10 TABLET ORAL at 12:07

## 2017-01-28 RX ADMIN — DIVALPROEX SODIUM 500 MILLIGRAM(S): 500 TABLET, DELAYED RELEASE ORAL at 05:46

## 2017-01-28 RX ADMIN — TIOTROPIUM BROMIDE 1 CAPSULE(S): 18 CAPSULE ORAL; RESPIRATORY (INHALATION) at 09:38

## 2017-01-28 RX ADMIN — ALBUTEROL 2 PUFF(S): 90 AEROSOL, METERED ORAL at 09:39

## 2017-01-28 RX ADMIN — Medication 1 APPLICATION(S): at 05:48

## 2017-01-28 RX ADMIN — Medication 0.5 MILLIGRAM(S): at 17:20

## 2017-01-28 RX ADMIN — AMLODIPINE BESYLATE 5 MILLIGRAM(S): 2.5 TABLET ORAL at 05:47

## 2017-01-28 RX ADMIN — HEPARIN SODIUM 5000 UNIT(S): 5000 INJECTION INTRAVENOUS; SUBCUTANEOUS at 05:46

## 2017-01-28 RX ADMIN — RISPERIDONE 1 MILLIGRAM(S): 4 TABLET ORAL at 23:44

## 2017-01-28 RX ADMIN — DIVALPROEX SODIUM 500 MILLIGRAM(S): 500 TABLET, DELAYED RELEASE ORAL at 17:20

## 2017-01-28 RX ADMIN — Medication 10 MILLIGRAM(S): at 12:07

## 2017-01-28 RX ADMIN — Medication 20 MILLIGRAM(S): at 05:48

## 2017-01-28 RX ADMIN — ALBUTEROL 2 PUFF(S): 90 AEROSOL, METERED ORAL at 15:59

## 2017-01-28 RX ADMIN — Medication 0.5 MILLIGRAM(S): at 05:46

## 2017-01-28 NOTE — PROGRESS NOTE ADULT - SUBJECTIVE AND OBJECTIVE BOX
HEALTH ISSUES - PROBLEM Dx:  81 yr old female with hypertension, hyperlipidemia, bipolar was sent from [facility] for weakness and fall. Labs and imaging reviewed. She has severe arthritis of both knees.     PAST MEDICAL & SURGICAL HISTORY:  Bipolar 1 disorder  Diabetes  High cholesterol  HTN (hypertension)  S/P appendectomy  No significant past surgical history      INTERVAL HPI/ OVERNIGHT EVENTS:  morbid obese, c/o adrian knee pain, left > right. now fairly controlled.  denies chest pain, nausea, vomits, cough, SOB, fever, HA    MEDICATIONS  (STANDING):  sodium chloride 0.9%. 1000milliLiter(s) IV Continuous <Continuous>  heparin  Injectable 5000Unit(s) SubCutaneous every 12 hours  enalapril 10milliGRAM(s) Oral daily  diVALproex ER 500milliGRAM(s) Oral two times a day  levETIRAcetam 500milliGRAM(s) Oral two times a day  sertraline 100milliGRAM(s) Oral daily  loratadine 10milliGRAM(s) Oral daily  benztropine 0.5milliGRAM(s) Oral two times a day  risperiDONE   Tablet 1milliGRAM(s) Oral at bedtime  ALBUTerol    90 MICROgram(s) HFA Inhaler 2Puff(s) Inhalation every 6 hours  tiotropium 18 MICROgram(s) Capsule 1Capsule(s) Inhalation daily  amLODIPine   Tablet 5milliGRAM(s) Oral daily  clotrimazole 1% Cream 1Application(s) Topical two times a day  furosemide    Tablet 20milliGRAM(s) Oral daily  oxybutynin 10milliGRAM(s) Oral daily  dextrose 5%. 1000milliLiter(s) IV Continuous <Continuous>  dextrose 50% Injectable 12.5Gram(s) IV Push once  dextrose 50% Injectable 25Gram(s) IV Push once  dextrose 50% Injectable 25Gram(s) IV Push once    MEDICATIONS  (PRN):  acetaminophen   Tablet 650milliGRAM(s) Oral every 8 hours PRN For Temp greater than 38 C (100.4 F)  acetaminophen   Tablet. 650milliGRAM(s) Oral every 6 hours PRN Moderate Pain (4 - 6)  dextrose Gel 1Dose(s) Oral once PRN Blood Glucose LESS THAN 70 milliGRAM(s)/deciliter  glucagon  Injectable 1milliGRAM(s) IntraMuscular once PRN Glucose LESS THAN 70 milligrams/deciliter      Allergies    No Known Allergies    Intolerances        Vital Signs Last 24 Hrs  T(C): 37, Max: 37.1 (01-28 @ 08:25)  T(F): 98.6, Max: 98.7 (01-28 @ 08:25)  HR: 81 (75 - 84)  BP: 111/70 (111/70 - 154/78)  BP(mean): --  RR: 20 (18 - 20)  SpO2: 92% (90% - 98%)    ACCUCHECKS    PHYSICAL EXAM-  GENERAL: NAD, well-groomed, well-developed  HEAD:  Atraumatic, Normocephalic  EYES: EOMI, PERRLA, conjunctiva and sclera clear  ENMT: No tonsillar erythema, exudates, or enlargement; Moist mucous membranes,   NECK: Supple, No JVD, Normal thyroid  NERVOUS SYSTEM:  Alert & Oriented X 3, Motor Strength 3/5 B/L upper and lower extremities;  CHEST/LUNG: Clear to percussion bilaterally; No rales, rhonchi, wheezing, or rubs  HEART: Regular rate and rhythm; No murmurs, rubs, or gallops  ABDOMEN: Soft, Nontender, Nondistended; Bowel sounds present  EXTREMITIES:  2+ Peripheral Pulses, No clubbing, cyanosis, or edema  LYMPH: No lymphadenopathy noted  SKIN: No rashes or lesions    LABS:      RADIOLOGY & ADDITIONAL TESTS:    Assessment and Plan  1. Falls- sec to adrian knee pain  2. adrian knee OA- pain control, Rehab, weight reduction  3. Morbid Obesity- lifestyle changes counselling done.  4. ? Near syncope.   no orthostasis. likely sec to pain Needs SAMEER.   5.Bipolar 1 disorder.  Continue home medications.   6.High cholesterol.   Continue statin.   7. HTN (hypertension).  Continue antihypertensives.  8. COnstipation- miralax and senna  DVT Prophylaxis- Heparin    Discussed with: Patient, family, RN, CM, Consultants  Plan of care/ Discharge planning discussed.    Visit Time: 45 mins

## 2017-01-29 LAB — HBA1C BLD-MCNC: 6.6 % — HIGH (ref 4–5.6)

## 2017-01-29 PROCEDURE — 99232 SBSQ HOSP IP/OBS MODERATE 35: CPT

## 2017-01-29 RX ORDER — KETOROLAC TROMETHAMINE 30 MG/ML
15 SYRINGE (ML) INJECTION EVERY 8 HOURS
Qty: 0 | Refills: 0 | Status: DISCONTINUED | OUTPATIENT
Start: 2017-01-29 | End: 2017-01-30

## 2017-01-29 RX ORDER — LIDOCAINE 4 G/100G
1 CREAM TOPICAL DAILY
Qty: 0 | Refills: 0 | Status: DISCONTINUED | OUTPATIENT
Start: 2017-01-29 | End: 2017-01-30

## 2017-01-29 RX ORDER — PANTOPRAZOLE SODIUM 20 MG/1
40 TABLET, DELAYED RELEASE ORAL
Qty: 0 | Refills: 0 | Status: DISCONTINUED | OUTPATIENT
Start: 2017-01-29 | End: 2017-01-30

## 2017-01-29 RX ORDER — TRAMADOL HYDROCHLORIDE 50 MG/1
50 TABLET ORAL THREE TIMES A DAY
Qty: 0 | Refills: 0 | Status: DISCONTINUED | OUTPATIENT
Start: 2017-01-29 | End: 2017-01-30

## 2017-01-29 RX ADMIN — Medication 0.5 MILLIGRAM(S): at 16:38

## 2017-01-29 RX ADMIN — TIOTROPIUM BROMIDE 1 CAPSULE(S): 18 CAPSULE ORAL; RESPIRATORY (INHALATION) at 09:11

## 2017-01-29 RX ADMIN — HEPARIN SODIUM 5000 UNIT(S): 5000 INJECTION INTRAVENOUS; SUBCUTANEOUS at 23:14

## 2017-01-29 RX ADMIN — Medication 1 APPLICATION(S): at 16:38

## 2017-01-29 RX ADMIN — Medication 10 MILLIGRAM(S): at 11:48

## 2017-01-29 RX ADMIN — HEPARIN SODIUM 5000 UNIT(S): 5000 INJECTION INTRAVENOUS; SUBCUTANEOUS at 07:00

## 2017-01-29 RX ADMIN — DIVALPROEX SODIUM 500 MILLIGRAM(S): 500 TABLET, DELAYED RELEASE ORAL at 16:37

## 2017-01-29 RX ADMIN — Medication 20 MILLIGRAM(S): at 07:00

## 2017-01-29 RX ADMIN — LORATADINE 10 MILLIGRAM(S): 10 TABLET ORAL at 11:48

## 2017-01-29 RX ADMIN — Medication 15 MILLIGRAM(S): at 14:12

## 2017-01-29 RX ADMIN — ALBUTEROL 2 PUFF(S): 90 AEROSOL, METERED ORAL at 21:44

## 2017-01-29 RX ADMIN — DIVALPROEX SODIUM 500 MILLIGRAM(S): 500 TABLET, DELAYED RELEASE ORAL at 06:56

## 2017-01-29 RX ADMIN — LEVETIRACETAM 500 MILLIGRAM(S): 250 TABLET, FILM COATED ORAL at 16:37

## 2017-01-29 RX ADMIN — TRAMADOL HYDROCHLORIDE 50 MILLIGRAM(S): 50 TABLET ORAL at 23:13

## 2017-01-29 RX ADMIN — AMLODIPINE BESYLATE 5 MILLIGRAM(S): 2.5 TABLET ORAL at 06:59

## 2017-01-29 RX ADMIN — Medication 10 MILLIGRAM(S): at 06:59

## 2017-01-29 RX ADMIN — TRAMADOL HYDROCHLORIDE 50 MILLIGRAM(S): 50 TABLET ORAL at 14:01

## 2017-01-29 RX ADMIN — PANTOPRAZOLE SODIUM 40 MILLIGRAM(S): 20 TABLET, DELAYED RELEASE ORAL at 23:14

## 2017-01-29 RX ADMIN — RISPERIDONE 1 MILLIGRAM(S): 4 TABLET ORAL at 23:14

## 2017-01-29 RX ADMIN — Medication 1 APPLICATION(S): at 07:00

## 2017-01-29 RX ADMIN — ALBUTEROL 2 PUFF(S): 90 AEROSOL, METERED ORAL at 09:10

## 2017-01-29 RX ADMIN — Medication 15 MILLIGRAM(S): at 13:54

## 2017-01-29 RX ADMIN — TRAMADOL HYDROCHLORIDE 50 MILLIGRAM(S): 50 TABLET ORAL at 13:55

## 2017-01-29 RX ADMIN — SENNA PLUS 2 TABLET(S): 8.6 TABLET ORAL at 23:12

## 2017-01-29 RX ADMIN — Medication 0.5 MILLIGRAM(S): at 06:56

## 2017-01-29 RX ADMIN — LEVETIRACETAM 500 MILLIGRAM(S): 250 TABLET, FILM COATED ORAL at 06:59

## 2017-01-29 RX ADMIN — POLYETHYLENE GLYCOL 3350 17 GRAM(S): 17 POWDER, FOR SOLUTION ORAL at 11:50

## 2017-01-29 RX ADMIN — HEPARIN SODIUM 5000 UNIT(S): 5000 INJECTION INTRAVENOUS; SUBCUTANEOUS at 13:54

## 2017-01-29 RX ADMIN — SERTRALINE 100 MILLIGRAM(S): 25 TABLET, FILM COATED ORAL at 11:50

## 2017-01-29 RX ADMIN — Medication 15 MILLIGRAM(S): at 23:12

## 2017-01-29 NOTE — PROGRESS NOTE ADULT - SUBJECTIVE AND OBJECTIVE BOX
HEALTH ISSUES - PROBLEM Dx:  81 yr old female with hypertension, hyperlipidemia, bipolar was sent from [facility] for weakness and fall. Labs and imaging reviewed. She has severe arthritis of both knees.       PAST MEDICAL & SURGICAL HISTORY:  Bipolar 1 disorder  Diabetes  High cholesterol  HTN (hypertension)  S/P appendectomy  No significant past surgical history      INTERVAL HPI/ OVERNIGHT EVENTS:  pain improved, no other issues  denies chest pain, nausea, vomits, cough, SOB, fever, HA    MEDICATIONS  (STANDING):  sodium chloride 0.9%. 1000milliLiter(s) IV Continuous <Continuous>  enalapril 10milliGRAM(s) Oral daily  diVALproex ER 500milliGRAM(s) Oral two times a day  levETIRAcetam 500milliGRAM(s) Oral two times a day  sertraline 100milliGRAM(s) Oral daily  loratadine 10milliGRAM(s) Oral daily  benztropine 0.5milliGRAM(s) Oral two times a day  risperiDONE   Tablet 1milliGRAM(s) Oral at bedtime  ALBUTerol    90 MICROgram(s) HFA Inhaler 2Puff(s) Inhalation every 6 hours  tiotropium 18 MICROgram(s) Capsule 1Capsule(s) Inhalation daily  amLODIPine   Tablet 5milliGRAM(s) Oral daily  clotrimazole 1% Cream 1Application(s) Topical two times a day  furosemide    Tablet 20milliGRAM(s) Oral daily  oxybutynin 10milliGRAM(s) Oral daily  dextrose 5%. 1000milliLiter(s) IV Continuous <Continuous>  dextrose 50% Injectable 12.5Gram(s) IV Push once  dextrose 50% Injectable 25Gram(s) IV Push once  dextrose 50% Injectable 25Gram(s) IV Push once  heparin  Injectable 5000Unit(s) SubCutaneous every 8 hours  polyethylene glycol 3350 17Gram(s) Oral daily  senna 2Tablet(s) Oral at bedtime  ketorolac   Injectable 15milliGRAM(s) IV Push every 8 hours  lidocaine   Patch 1Patch Transdermal daily  traMADol 50milliGRAM(s) Oral three times a day  pantoprazole    Tablet 40milliGRAM(s) Oral before breakfast    MEDICATIONS  (PRN):  acetaminophen   Tablet 650milliGRAM(s) Oral every 8 hours PRN For Temp greater than 38 C (100.4 F)  acetaminophen   Tablet. 650milliGRAM(s) Oral every 6 hours PRN Moderate Pain (4 - 6)  dextrose Gel 1Dose(s) Oral once PRN Blood Glucose LESS THAN 70 milliGRAM(s)/deciliter  glucagon  Injectable 1milliGRAM(s) IntraMuscular once PRN Glucose LESS THAN 70 milligrams/deciliter      Allergies    No Known Allergies    Intolerances        Vital Signs Last 24 Hrs  T(C): 37.2, Max: 37.4 (01-28 @ 21:30)  T(F): 99, Max: 99.3 (01-28 @ 21:30)  HR: 74 (74 - 81)  BP: 149/92 (111/70 - 154/84)  BP(mean): --  RR: 18 (18 - 20)  SpO2: 90% (90% - 96%)    ACCUCHECKS    PHYSICAL EXAM-  GENERAL: morbid obesity well-groomed, well-developed  HEAD:  Atraumatic, Normocephalic  EYES: EOMI, PERRLA, conjunctiva and sclera clear  ENMT: No tonsillar erythema, exudates, or enlargement; Moist mucous membranes,   NECK: Supple, No JVD, Normal thyroid  NERVOUS SYSTEM:  Alert & Oriented X3, Motor Strength 3/5 B/L upper and lower extremities;   CHEST/LUNG: Clear to percussion bilaterally; No rales, rhonchi, wheezing, or rubs  HEART: Regular rate and rhythm; No murmurs, rubs, or gallops  ABDOMEN: Soft, Nontender, Nondistended; Bowel sounds present  EXTREMITIES:  2+ Peripheral Pulses, No clubbing, cyanosis, or edema  LYMPH: No lymphadenopathy noted  SKIN: No rashes or lesions    LABS:      RADIOLOGY & ADDITIONAL TESTS:    Assessment and Plan  1. Falls- sec to adrian knee pain  2. adrian knee OA- pain controlled, Rehab, weight reduction  3. Morbid Obesity- lifestyle changes counselling done.  4. ? Near syncope.   no orthostasis. likely sec to pain Needs SAMEER.   5.Bipolar 1 disorder.  Continue home medications.   6.High cholesterol.   Continue statin.   7. HTN (hypertension).  Continue antihypertensives.  8. Constipation- miralax and senna  DVT Prophylaxis- Heparin      Discussed with: Patient, family, RN, CM, Consultants  Plan of care/ Discharge planning discussed.    Visit Time: 45 mins

## 2017-01-30 VITALS — OXYGEN SATURATION: 91 %

## 2017-01-30 PROCEDURE — 71045 X-RAY EXAM CHEST 1 VIEW: CPT

## 2017-01-30 PROCEDURE — 85730 THROMBOPLASTIN TIME PARTIAL: CPT

## 2017-01-30 PROCEDURE — 36415 COLL VENOUS BLD VENIPUNCTURE: CPT

## 2017-01-30 PROCEDURE — 82962 GLUCOSE BLOOD TEST: CPT

## 2017-01-30 PROCEDURE — 85610 PROTHROMBIN TIME: CPT

## 2017-01-30 PROCEDURE — 94760 N-INVAS EAR/PLS OXIMETRY 1: CPT

## 2017-01-30 PROCEDURE — 80048 BASIC METABOLIC PNL TOTAL CA: CPT

## 2017-01-30 PROCEDURE — 84100 ASSAY OF PHOSPHORUS: CPT

## 2017-01-30 PROCEDURE — 94640 AIRWAY INHALATION TREATMENT: CPT

## 2017-01-30 PROCEDURE — 83036 HEMOGLOBIN GLYCOSYLATED A1C: CPT

## 2017-01-30 PROCEDURE — 85027 COMPLETE CBC AUTOMATED: CPT

## 2017-01-30 PROCEDURE — 93005 ELECTROCARDIOGRAM TRACING: CPT

## 2017-01-30 PROCEDURE — 83735 ASSAY OF MAGNESIUM: CPT

## 2017-01-30 PROCEDURE — 80164 ASSAY DIPROPYLACETIC ACD TOT: CPT

## 2017-01-30 PROCEDURE — 99239 HOSP IP/OBS DSCHRG MGMT >30: CPT

## 2017-01-30 PROCEDURE — 81001 URINALYSIS AUTO W/SCOPE: CPT

## 2017-01-30 PROCEDURE — 99285 EMERGENCY DEPT VISIT HI MDM: CPT | Mod: 25

## 2017-01-30 PROCEDURE — 82550 ASSAY OF CK (CPK): CPT

## 2017-01-30 PROCEDURE — 84484 ASSAY OF TROPONIN QUANT: CPT

## 2017-01-30 PROCEDURE — 70450 CT HEAD/BRAIN W/O DYE: CPT

## 2017-01-30 PROCEDURE — 73562 X-RAY EXAM OF KNEE 3: CPT

## 2017-01-30 PROCEDURE — 97163 PT EVAL HIGH COMPLEX 45 MIN: CPT

## 2017-01-30 PROCEDURE — 80053 COMPREHEN METABOLIC PANEL: CPT

## 2017-01-30 RX ORDER — TRAMADOL HYDROCHLORIDE 50 MG/1
1 TABLET ORAL
Qty: 0 | Refills: 0 | COMMUNITY
Start: 2017-01-30

## 2017-01-30 RX ORDER — POLYETHYLENE GLYCOL 3350 17 G/17G
17 POWDER, FOR SOLUTION ORAL
Qty: 0 | Refills: 0 | COMMUNITY
Start: 2017-01-30

## 2017-01-30 RX ORDER — PANTOPRAZOLE SODIUM 20 MG/1
1 TABLET, DELAYED RELEASE ORAL
Qty: 0 | Refills: 0 | COMMUNITY
Start: 2017-01-30

## 2017-01-30 RX ORDER — SENNA PLUS 8.6 MG/1
2 TABLET ORAL
Qty: 0 | Refills: 0 | COMMUNITY
Start: 2017-01-30

## 2017-01-30 RX ORDER — LIDOCAINE 4 G/100G
1 CREAM TOPICAL
Qty: 0 | Refills: 0 | COMMUNITY
Start: 2017-01-30

## 2017-01-30 RX ADMIN — Medication 1 APPLICATION(S): at 06:00

## 2017-01-30 RX ADMIN — HEPARIN SODIUM 5000 UNIT(S): 5000 INJECTION INTRAVENOUS; SUBCUTANEOUS at 05:26

## 2017-01-30 RX ADMIN — ALBUTEROL 2 PUFF(S): 90 AEROSOL, METERED ORAL at 03:34

## 2017-01-30 RX ADMIN — Medication 15 MILLIGRAM(S): at 00:12

## 2017-01-30 RX ADMIN — Medication 15 MILLIGRAM(S): at 05:27

## 2017-01-30 RX ADMIN — SERTRALINE 100 MILLIGRAM(S): 25 TABLET, FILM COATED ORAL at 13:04

## 2017-01-30 RX ADMIN — TIOTROPIUM BROMIDE 1 CAPSULE(S): 18 CAPSULE ORAL; RESPIRATORY (INHALATION) at 09:23

## 2017-01-30 RX ADMIN — LEVETIRACETAM 500 MILLIGRAM(S): 250 TABLET, FILM COATED ORAL at 05:26

## 2017-01-30 RX ADMIN — POLYETHYLENE GLYCOL 3350 17 GRAM(S): 17 POWDER, FOR SOLUTION ORAL at 13:02

## 2017-01-30 RX ADMIN — Medication 10 MILLIGRAM(S): at 05:26

## 2017-01-30 RX ADMIN — ALBUTEROL 2 PUFF(S): 90 AEROSOL, METERED ORAL at 20:11

## 2017-01-30 RX ADMIN — LORATADINE 10 MILLIGRAM(S): 10 TABLET ORAL at 13:05

## 2017-01-30 RX ADMIN — TRAMADOL HYDROCHLORIDE 50 MILLIGRAM(S): 50 TABLET ORAL at 00:12

## 2017-01-30 RX ADMIN — Medication 1 APPLICATION(S): at 17:55

## 2017-01-30 RX ADMIN — AMLODIPINE BESYLATE 5 MILLIGRAM(S): 2.5 TABLET ORAL at 05:27

## 2017-01-30 RX ADMIN — LEVETIRACETAM 500 MILLIGRAM(S): 250 TABLET, FILM COATED ORAL at 17:53

## 2017-01-30 RX ADMIN — PANTOPRAZOLE SODIUM 40 MILLIGRAM(S): 20 TABLET, DELAYED RELEASE ORAL at 05:28

## 2017-01-30 RX ADMIN — LIDOCAINE 1 PATCH: 4 CREAM TOPICAL at 13:02

## 2017-01-30 RX ADMIN — Medication 0.5 MILLIGRAM(S): at 17:53

## 2017-01-30 RX ADMIN — TRAMADOL HYDROCHLORIDE 50 MILLIGRAM(S): 50 TABLET ORAL at 05:26

## 2017-01-30 RX ADMIN — TRAMADOL HYDROCHLORIDE 50 MILLIGRAM(S): 50 TABLET ORAL at 06:00

## 2017-01-30 RX ADMIN — TRAMADOL HYDROCHLORIDE 50 MILLIGRAM(S): 50 TABLET ORAL at 15:40

## 2017-01-30 RX ADMIN — Medication 0.5 MILLIGRAM(S): at 05:27

## 2017-01-30 RX ADMIN — ALBUTEROL 2 PUFF(S): 90 AEROSOL, METERED ORAL at 15:52

## 2017-01-30 RX ADMIN — Medication 15 MILLIGRAM(S): at 13:15

## 2017-01-30 RX ADMIN — Medication 15 MILLIGRAM(S): at 06:00

## 2017-01-30 RX ADMIN — Medication 10 MILLIGRAM(S): at 13:05

## 2017-01-30 RX ADMIN — TRAMADOL HYDROCHLORIDE 50 MILLIGRAM(S): 50 TABLET ORAL at 14:40

## 2017-01-30 RX ADMIN — Medication 15 MILLIGRAM(S): at 13:07

## 2017-01-30 RX ADMIN — ALBUTEROL 2 PUFF(S): 90 AEROSOL, METERED ORAL at 09:18

## 2017-01-30 RX ADMIN — DIVALPROEX SODIUM 500 MILLIGRAM(S): 500 TABLET, DELAYED RELEASE ORAL at 17:55

## 2017-01-30 RX ADMIN — DIVALPROEX SODIUM 500 MILLIGRAM(S): 500 TABLET, DELAYED RELEASE ORAL at 05:27

## 2017-01-30 RX ADMIN — HEPARIN SODIUM 5000 UNIT(S): 5000 INJECTION INTRAVENOUS; SUBCUTANEOUS at 13:06

## 2017-01-30 RX ADMIN — Medication 20 MILLIGRAM(S): at 05:27

## 2017-01-30 NOTE — PROGRESS NOTE ADULT - SUBJECTIVE AND OBJECTIVE BOX
HEALTH ISSUES - PROBLEM Dx:  81 yr old female with hypertension, hyperlipidemia, bipolar was sent from [facility] for weakness and fall. Labs and imaging reviewed. She has severe arthritis of both knees.    PAST MEDICAL & SURGICAL HISTORY:  Bipolar 1 disorder  Diabetes  High cholesterol  HTN (hypertension)  S/P appendectomy  No significant past surgical history      INTERVAL HPI/ OVERNIGHT EVENTS:      denies chest pain, nausea, vomits, cough, SOB, fever, HA    MEDICATIONS  (STANDING):  enalapril 10milliGRAM(s) Oral daily  diVALproex ER 500milliGRAM(s) Oral two times a day  levETIRAcetam 500milliGRAM(s) Oral two times a day  sertraline 100milliGRAM(s) Oral daily  loratadine 10milliGRAM(s) Oral daily  benztropine 0.5milliGRAM(s) Oral two times a day  risperiDONE   Tablet 1milliGRAM(s) Oral at bedtime  ALBUTerol    90 MICROgram(s) HFA Inhaler 2Puff(s) Inhalation every 6 hours  tiotropium 18 MICROgram(s) Capsule 1Capsule(s) Inhalation daily  amLODIPine   Tablet 5milliGRAM(s) Oral daily  clotrimazole 1% Cream 1Application(s) Topical two times a day  furosemide    Tablet 20milliGRAM(s) Oral daily  oxybutynin 10milliGRAM(s) Oral daily  dextrose 5%. 1000milliLiter(s) IV Continuous <Continuous>  dextrose 50% Injectable 12.5Gram(s) IV Push once  dextrose 50% Injectable 25Gram(s) IV Push once  dextrose 50% Injectable 25Gram(s) IV Push once  heparin  Injectable 5000Unit(s) SubCutaneous every 8 hours  polyethylene glycol 3350 17Gram(s) Oral daily  senna 2Tablet(s) Oral at bedtime  ketorolac   Injectable 15milliGRAM(s) IV Push every 8 hours  lidocaine   Patch 1Patch Transdermal daily  traMADol 50milliGRAM(s) Oral three times a day  pantoprazole    Tablet 40milliGRAM(s) Oral before breakfast    MEDICATIONS  (PRN):  acetaminophen   Tablet 650milliGRAM(s) Oral every 8 hours PRN For Temp greater than 38 C (100.4 F)  acetaminophen   Tablet. 650milliGRAM(s) Oral every 6 hours PRN Moderate Pain (4 - 6)  dextrose Gel 1Dose(s) Oral once PRN Blood Glucose LESS THAN 70 milliGRAM(s)/deciliter  glucagon  Injectable 1milliGRAM(s) IntraMuscular once PRN Glucose LESS THAN 70 milligrams/deciliter      Allergies    No Known Allergies    Intolerances        Vital Signs Last 24 Hrs  T(C): 36.5, Max: 37.1 (01-30 @ 00:08)  T(F): 97.7, Max: 98.7 (01-30 @ 00:08)  HR: 78 (72 - 78)  BP: 146/88 (146/88 - 155/93)  BP(mean): --  RR: 18 (18 - 18)  SpO2: 95% (90% - 96%)    ACCUCHECKS    PHYSICAL EXAM-  GENERAL: morbid obesity well-groomed, well-developed  HEAD:  Atraumatic, Normocephalic  EYES: EOMI, PERRLA, conjunctiva and sclera clear  ENMT: No tonsillar erythema, exudates, or enlargement; Moist mucous membranes,   NECK: Supple, No JVD, Normal thyroid  NERVOUS SYSTEM:  Alert & Oriented X 3, Motor Strength 3/5 B/L upper and lower extremities;   CHEST/LUNG: Clear to percussion bilaterally; No rales, rhonchi, wheezing, or rubs  HEART: Regular rate and rhythm; No murmurs, rubs, or gallops  ABDOMEN: Soft, Nontender, Nondistended; Bowel sounds present  EXTREMITIES:  2+ Peripheral Pulses, No clubbing, cyanosis, or edema  LYMPH: No lymphadenopathy noted  SKIN: No rashes or lesions    LABS:      RADIOLOGY & ADDITIONAL TESTS:    Assessment and Plan  1. Falls- sec to adrian knee pain  2. adrian knee OA- pain controlled, Rehab, weight reduction  3. Morbid Obesity- lifestyle changes counselling done.  4. ? Near syncope.   no orthostasis. likely sec to pain Needs SAMEER.   5.Bipolar 1 disorder.  Continue home medications.   6.High cholesterol.   Continue statin.   7. HTN (hypertension).  Continue antihypertensives.  8. Constipation- miralax and senna    DVT Prophylaxis- Heparin  Await SAMEER placement    Discussed with: Patient, family, RN, CM, Consultants  Plan of care/ Discharge planning discussed.    Visit Time: 45 mins

## 2017-02-15 ENCOUNTER — APPOINTMENT (OUTPATIENT)
Dept: PULMONOLOGY | Facility: CLINIC | Age: 62
End: 2017-02-15

## 2018-07-18 ENCOUNTER — EMERGENCY (EMERGENCY)
Facility: HOSPITAL | Age: 63
LOS: 1 days | Discharge: HOME | End: 2018-07-18
Attending: EMERGENCY MEDICINE
Payer: MEDICARE

## 2018-07-18 VITALS
HEART RATE: 76 BPM | OXYGEN SATURATION: 96 % | DIASTOLIC BLOOD PRESSURE: 78 MMHG | RESPIRATION RATE: 18 BRPM | TEMPERATURE: 98 F | SYSTOLIC BLOOD PRESSURE: 134 MMHG

## 2018-07-18 VITALS
OXYGEN SATURATION: 98 % | SYSTOLIC BLOOD PRESSURE: 120 MMHG | RESPIRATION RATE: 19 BRPM | DIASTOLIC BLOOD PRESSURE: 74 MMHG | TEMPERATURE: 97 F | HEART RATE: 70 BPM

## 2018-07-18 DIAGNOSIS — N39.0 URINARY TRACT INFECTION, SITE NOT SPECIFIED: ICD-10-CM

## 2018-07-18 DIAGNOSIS — F05 DELIRIUM DUE TO KNOWN PHYSIOLOGICAL CONDITION: ICD-10-CM

## 2018-07-18 DIAGNOSIS — F31.9 BIPOLAR DISORDER, UNSPECIFIED: ICD-10-CM

## 2018-07-18 LAB
ALBUMIN SERPL ELPH-MCNC: 3.9 G/DL — SIGNIFICANT CHANGE UP (ref 3.3–5.2)
ALP SERPL-CCNC: 68 U/L — SIGNIFICANT CHANGE UP (ref 40–120)
ALT FLD-CCNC: 21 U/L — SIGNIFICANT CHANGE UP
ANION GAP SERPL CALC-SCNC: 17 MMOL/L — SIGNIFICANT CHANGE UP (ref 5–17)
ANISOCYTOSIS BLD QL: SLIGHT — SIGNIFICANT CHANGE UP
APAP SERPL-MCNC: <7.5 UG/ML — LOW (ref 10–26)
APPEARANCE UR: CLEAR — SIGNIFICANT CHANGE UP
AST SERPL-CCNC: 23 U/L — SIGNIFICANT CHANGE UP
BASOPHILS NFR BLD AUTO: 1 % — SIGNIFICANT CHANGE UP (ref 0–2)
BILIRUB SERPL-MCNC: <0.2 MG/DL — LOW (ref 0.4–2)
BILIRUB UR-MCNC: NEGATIVE — SIGNIFICANT CHANGE UP
BUN SERPL-MCNC: 21 MG/DL — HIGH (ref 8–20)
CALCIUM SERPL-MCNC: 9.8 MG/DL — SIGNIFICANT CHANGE UP (ref 8.6–10.2)
CHLORIDE SERPL-SCNC: 98 MMOL/L — SIGNIFICANT CHANGE UP (ref 98–107)
CO2 SERPL-SCNC: 24 MMOL/L — SIGNIFICANT CHANGE UP (ref 22–29)
COLOR SPEC: YELLOW — SIGNIFICANT CHANGE UP
CREAT SERPL-MCNC: 0.28 MG/DL — LOW (ref 0.5–1.3)
DIFF PNL FLD: NEGATIVE — SIGNIFICANT CHANGE UP
ELLIPTOCYTES BLD QL SMEAR: SLIGHT — SIGNIFICANT CHANGE UP
EOSINOPHIL NFR BLD AUTO: 2 % — SIGNIFICANT CHANGE UP (ref 0–5)
ETHANOL SERPL-MCNC: <10 MG/DL — SIGNIFICANT CHANGE UP
GLUCOSE SERPL-MCNC: 104 MG/DL — SIGNIFICANT CHANGE UP (ref 70–115)
GLUCOSE UR QL: NEGATIVE MG/DL — SIGNIFICANT CHANGE UP
HCT VFR BLD CALC: 33.9 % — LOW (ref 37–47)
HGB BLD-MCNC: 10.4 G/DL — LOW (ref 12–16)
KETONES UR-MCNC: ABNORMAL
LEUKOCYTE ESTERASE UR-ACNC: NEGATIVE — SIGNIFICANT CHANGE UP
LYMPHOCYTES # BLD AUTO: 28 % — SIGNIFICANT CHANGE UP (ref 20–55)
MAGNESIUM SERPL-MCNC: 2.2 MG/DL — SIGNIFICANT CHANGE UP (ref 1.6–2.6)
MANUAL DIF COMMENT BLD-IMP: SIGNIFICANT CHANGE UP
MANUAL DIF COMMENT BLD-IMP: SIGNIFICANT CHANGE UP
MCHC RBC-ENTMCNC: 23.7 PG — LOW (ref 27–31)
MCHC RBC-ENTMCNC: 30.7 G/DL — LOW (ref 32–36)
MCV RBC AUTO: 77.4 FL — LOW (ref 81–99)
MICROCYTES BLD QL: SLIGHT — SIGNIFICANT CHANGE UP
MONOCYTES NFR BLD AUTO: 5 % — SIGNIFICANT CHANGE UP (ref 3–10)
NEUTROPHILS NFR BLD AUTO: 63 % — SIGNIFICANT CHANGE UP (ref 37–73)
NEUTS BAND # BLD: 1 % — SIGNIFICANT CHANGE UP (ref 0–8)
NITRITE UR-MCNC: NEGATIVE — SIGNIFICANT CHANGE UP
PH UR: 6 — SIGNIFICANT CHANGE UP (ref 5–8)
PLAT MORPH BLD: SIGNIFICANT CHANGE UP
PLATELET # BLD AUTO: 275 K/UL — SIGNIFICANT CHANGE UP (ref 150–400)
POTASSIUM SERPL-MCNC: 4.6 MMOL/L — SIGNIFICANT CHANGE UP (ref 3.5–5.3)
POTASSIUM SERPL-SCNC: 4.6 MMOL/L — SIGNIFICANT CHANGE UP (ref 3.5–5.3)
PROT SERPL-MCNC: 7.1 G/DL — SIGNIFICANT CHANGE UP (ref 6.6–8.7)
PROT UR-MCNC: NEGATIVE MG/DL — SIGNIFICANT CHANGE UP
RBC # BLD: 4.38 M/UL — LOW (ref 4.4–5.2)
RBC # FLD: 16.6 % — HIGH (ref 11–15.6)
RBC BLD AUTO: SIGNIFICANT CHANGE UP
SALICYLATES SERPL-MCNC: <0.6 MG/DL — LOW (ref 10–20)
SODIUM SERPL-SCNC: 139 MMOL/L — SIGNIFICANT CHANGE UP (ref 135–145)
SP GR SPEC: 1.01 — SIGNIFICANT CHANGE UP (ref 1.01–1.02)
T4 AB SER-ACNC: 7 UG/DL — SIGNIFICANT CHANGE UP (ref 4.5–12)
TSH SERPL-MCNC: 1.92 UIU/ML — SIGNIFICANT CHANGE UP (ref 0.27–4.2)
UROBILINOGEN FLD QL: NEGATIVE MG/DL — SIGNIFICANT CHANGE UP
WBC # BLD: 8.7 K/UL — SIGNIFICANT CHANGE UP (ref 4.8–10.8)
WBC # FLD AUTO: 8.7 K/UL — SIGNIFICANT CHANGE UP (ref 4.8–10.8)

## 2018-07-18 PROCEDURE — 83735 ASSAY OF MAGNESIUM: CPT

## 2018-07-18 PROCEDURE — 90792 PSYCH DIAG EVAL W/MED SRVCS: CPT

## 2018-07-18 PROCEDURE — 36415 COLL VENOUS BLD VENIPUNCTURE: CPT

## 2018-07-18 PROCEDURE — 81003 URINALYSIS AUTO W/O SCOPE: CPT

## 2018-07-18 PROCEDURE — 85027 COMPLETE CBC AUTOMATED: CPT

## 2018-07-18 PROCEDURE — 84443 ASSAY THYROID STIM HORMONE: CPT

## 2018-07-18 PROCEDURE — 70450 CT HEAD/BRAIN W/O DYE: CPT | Mod: 26

## 2018-07-18 PROCEDURE — 93010 ELECTROCARDIOGRAM REPORT: CPT

## 2018-07-18 PROCEDURE — 80053 COMPREHEN METABOLIC PANEL: CPT

## 2018-07-18 PROCEDURE — 99285 EMERGENCY DEPT VISIT HI MDM: CPT

## 2018-07-18 PROCEDURE — 80307 DRUG TEST PRSMV CHEM ANLYZR: CPT

## 2018-07-18 PROCEDURE — 70450 CT HEAD/BRAIN W/O DYE: CPT

## 2018-07-18 PROCEDURE — 84436 ASSAY OF TOTAL THYROXINE: CPT

## 2018-07-18 PROCEDURE — 99284 EMERGENCY DEPT VISIT MOD MDM: CPT | Mod: 25

## 2018-07-18 PROCEDURE — 93005 ELECTROCARDIOGRAM TRACING: CPT

## 2018-07-18 NOTE — ED PROVIDER NOTE - MEDICAL DECISION MAKING DETAILS
Labs normal, no uti, CTH neg and seen and cleared by psych. Pt with dementia with behavioral disturbances and likely overlying delirium. Psych Dr. Negrete discussed with NH staff, no acute psych or medical intervention at this time.

## 2018-07-18 NOTE — ED BEHAVIORAL HEALTH ASSESSMENT NOTE - OTHER
not observed pressured at times unable to evaluate oddly bright unable to evaluate due to altered mental status -

## 2018-07-18 NOTE — ED BEHAVIORAL HEALTH ASSESSMENT NOTE - PRIMARY DX
Urinary tract infection with hematuria, site unspecified Urinary tract infection without hematuria, site unspecified

## 2018-07-18 NOTE — ED ADULT NURSE REASSESSMENT NOTE - NS ED NURSE REASSESS COMMENT FT1
Assumed patient care from TRAVIS Reinoso at 1930, charting as noted. Received patient lying in bed, alert and oriented x 1, able to make simple needs known. Noted babbling at times, calm. Patient denies any pain or discomfort. Patient received with no iv access. As per Dr. Ray, patient doesn't need an iv at this time. Plan of care and wait time explained, patient verbalized understanding. Patient not in distress.

## 2018-07-18 NOTE — ED PROVIDER NOTE - OBJECTIVE STATEMENT
64 y/o F pt with hx of Bipolar, DM, HLD, HTN presents to ED BIBA for increasing deteriorating mental status since 7/5/2018. EMS states she is combative, erratic behavior, throwing objects at staff members at the home she is currently at. They state they are titrating her off of her Invega medication over the past year. The pt is currently babbling, she is aware that she is in a hospital as well as her name but otherwise disoriented and confused. She was given Harldol last night. Denies SI/HI, LOC, hitting her head, N/V/D, fever, chills, SOB, CP, difficulty breathing, HA, diaphoresis, leg swelling, blurry vision or abd pain.  No further complaints at this time.

## 2018-07-18 NOTE — ED ADULT TRIAGE NOTE - CHIEF COMPLAINT QUOTE
pt BIBA with bizarre behavior, ems states pt has been titrated off psych meds and is now babbling, and acting strange. denies SI/HI

## 2018-07-18 NOTE — ED BEHAVIORAL HEALTH ASSESSMENT NOTE - CURRENT MEDICATION
amlodipine 5 mg daily Cogentin 0.5 mg BID, Depakote sprinkle 250 mg qhs, enalapril 10 mg daily, ivega sustenna 78 mg  q 2nd of month, ipratropium albuterol 0.5/2.5 q 6 hours prn, melatonin 5 mg qhs , flagyl cream 0.75% face daiy until 7/25, ketoconazole 2% to head q Tue and Thurs , miralax 17 g daily , pravastatin sodium 40 mg qhs, risperidone 1 mg daily senna qhs, sertraline 75 mg daily , sinemet 25/100 TID, voltaren 1% 4 grams q 8 hours prn pain

## 2018-07-18 NOTE — ED BEHAVIORAL HEALTH ASSESSMENT NOTE - DESCRIPTION
Vital Signs Last 24 Hrs  T(C): 36.6 (18 Jul 2018 15:12), Max: 36.6 (18 Jul 2018 15:12)  T(F): 97.9 (18 Jul 2018 15:12), Max: 97.9 (18 Jul 2018 15:12)  HR: 76 (18 Jul 2018 15:12) (76 - 76)  BP: 134/78 (18 Jul 2018 15:12) (134/78 - 134/78)  BP(mean): --  RR: 18 (18 Jul 2018 15:12) (18 - 18)  SpO2: 96% (18 Jul 2018 15:12) (96% - 96%)    calm intermittently giggling Copd t2 dm, hL, HTN, GERD, convulsions, falls, parkinson's disease lives in nursing home

## 2018-07-18 NOTE — ED PROVIDER NOTE - PSYCHIATRIC, MLM
awake, alert, oriented to person (x1), intermittently following commands, no apparent risk to self or others.

## 2018-07-18 NOTE — ED BEHAVIORAL HEALTH ASSESSMENT NOTE - SUMMARY
63 year old woman, hx of parkinson's disease, bipolar disorder, prior psychiatric hospitalizations, dementia with behavioral disturbance domiciled in Nursing home BIBEMS due to aggression and altered mental status in nursing home.  Patient presents confused, grossly disorganized with incoherent speech.  Per collateral from nursing home patient has not been at her baseline since 7/25 , was treated with 7 days course of bactrim however remains altered, appears to be having visual hallucinations, no longer knows where she is or date, at baseline is oriented x3. 63 year old woman, hx of parkinson's disease, bipolar disorder by history , prior psychiatric hospitalizations, dementia with behavioral disturbance domiciled in Nursing home BIBEMS due to aggression and altered mental status in nursing home.  Patient presents confused, grossly disorganized with incoherent speech.  Per collateral from nursing home patient has not been at her baseline since 7/25 , was treated with 7 days course of bactrim however remains altered, appears to be having visual hallucinations, no longer knows where she is or date, at baseline is oriented x3.    Labs resulted today negative for active UTI, suspect residual delirium from UTI which may persist for days to weeks.  Patient calm in ED ,  was observed for several hours at [facility] and did not display any aggression. Patient with no hx of aggression other than earlier today.   Inpatient psychiatry not indicated as patient appears delirious at this time, and no persistent aggression in ED.  Would recommend haldol 2 mg IM BID PRN agitation, discussed this with nursing home staff, also advised them to have NH psychiatrist see patient as soon as possible.

## 2018-07-18 NOTE — ED BEHAVIORAL HEALTH ASSESSMENT NOTE - HPI (INCLUDE ILLNESS QUALITY, SEVERITY, DURATION, TIMING, CONTEXT, MODIFYING FACTORS, ASSOCIATED SIGNS AND SYMPTOMS)
63 year old woman, hx of parkinson's disease, psychotic disorder, prior psychiatric hospitalizations, dementia with behavioral disturbance domiciled in Nursing home BIBEMS due to aggression in nursing home.    Upon evaluation patient babbling incoherently, speaks of a fire but cannot elaborate  also makes nonsensical statement such as stating she was buried, then states "cut it down and let him through" also speaks of a certain "gia" but cannot identify who this is, giggles intermittently. She denies suicidal and homicidal ideation. She reports sleeping at night. asks patient where she is states she is in care home.  Gave choice of [employer] and hospital, she was able to pick hospital but does not know which hospital.   She endorses abdominal pain.  Contacted [facility] RN who states at baseline patient is normally oriented x 3 , social interactive, allows nails to be done by other residents, not paranoid and  pleasant. RN states sine 6/25 patient had sudden onset of confusion and disorientation, speaking nonsensically. She reports patient subsequently found to have UTI and was treated with a  7 day course of bactrim completed on 7/14 however confusion has persisted. She states patient has no hx of aggression until today when patient was reported to be combative with staff, however cannot further describe nature of combativeness as she was not present.  RN states patient has appeared to be having visual hallucinations.  Informed RN that patient in ED here is speaking nonsensically and babbling, she states this has consistently been patient's mental state since 7/25 and is not her baseline.

## 2018-07-18 NOTE — ED ADULT NURSE REASSESSMENT NOTE - NS ED NURSE REASSESS COMMENT FT1
Attempted to draw blood by two different nurses, unable to obtain blood sample, Dr. Pandey made aware

## 2018-07-18 NOTE — ED BEHAVIORAL HEALTH ASSESSMENT NOTE - RISK ASSESSMENT
Chronic risk due to hx of psychiatric illness. Chronic risk due to hx of psychiatric illness. Acute risk due to recent medical problems and aggression. However patient denies suicidal and homicidal ideation, and did not otherwise display signs of being suicidal or homicidal , does not have weapons, not abusing substances. Patient assessed to not be at imminent risk of harm to self or others.

## 2018-07-18 NOTE — ED BEHAVIORAL HEALTH ASSESSMENT NOTE - SAFETY PLAN DETAILS
advised nursing home to return patient to ED or call 911 if mental status worsens or if she becomes a danger to self or others.

## 2019-02-20 ENCOUNTER — TRANSCRIPTION ENCOUNTER (OUTPATIENT)
Age: 64
End: 2019-02-20

## 2019-02-21 ENCOUNTER — OUTPATIENT (OUTPATIENT)
Dept: OUTPATIENT SERVICES | Facility: HOSPITAL | Age: 64
LOS: 1 days | End: 2019-02-21
Payer: MEDICARE

## 2019-02-21 ENCOUNTER — RESULT REVIEW (OUTPATIENT)
Age: 64
End: 2019-02-21

## 2019-02-21 DIAGNOSIS — D50.9 IRON DEFICIENCY ANEMIA, UNSPECIFIED: ICD-10-CM

## 2019-02-21 PROCEDURE — 88305 TISSUE EXAM BY PATHOLOGIST: CPT | Mod: 26

## 2019-02-21 PROCEDURE — 45380 COLONOSCOPY AND BIOPSY: CPT

## 2019-02-21 PROCEDURE — 88305 TISSUE EXAM BY PATHOLOGIST: CPT

## 2019-02-25 LAB — SURGICAL PATHOLOGY STUDY: SIGNIFICANT CHANGE UP

## 2021-10-25 NOTE — ED PROVIDER NOTE - CONSTITUTIONAL APPEARANCE HYGIENE, MLM
Patient Education     General Neck and Back Pain    Both neck and back pain are usually caused by injury to the muscles or ligaments of the spine. Sometimes the disks that separate each bone of the spine may cause pain by pressing on a nearby nerve. Back and neck pain may appear after a sudden twisting or bending force (such as in a car accident), or sometimes after a simple awkward movement. In either case, muscle spasm is often present and adds to the pain.   Acute neck and back pain usually gets better in 1 to 2 weeks. Pain related to disk disease, arthritis in the spinal joints, or narrowing of the spinal canal (spinal stenosis) can become chronic and last for months or years.   Back and neck pain are common problems. Most people feel better in 1 or 2 weeks, and most of the rest in 1 to 2 months. Most people can remain active.   People have and describe pain differently.  · Pain can be sharp, stabbing, shooting, aching, cramping, or burning  · Movement, standing, bending, lifting, sitting, or walking may worsen the pain  · Pain can be limited to one spot or area, or it can be more generalized  · Pain can spread upward, downward, to the front, or go down your arms or legs  · Muscle spasm may occur.  Most of the time mechanical problems with the muscles or spine cause the pain. It's usually caused by an injury, whether known or not, to the muscles or ligaments. Pain without an injury is not common. But it can sometimes be caused by a health problem such as kidney stones or an infection. Pain is usually related to physical activity such as sports, exercise, work, or normal activity. Sometimes it can occur without an identifiable cause. This can happen simply by stretching or moving wrong, without noting pain at the time. Other causes include:   · Overexertion, lifting, pushing, pulling incorrectly or too aggressively.  · Sudden twisting, bending or stretching from an accident (car or fall), or accidental  movement.  · Poor posture  · Poor conditioning, lack of regular exercise  · Spinal disc disease or arthritis  · Stress  · Pregnancy, or illness like appendicitis, bladder or kidney infection, pelvic infections   Home care  · For neck pain: Use a comfortable pillow that supports the head and keeps the spine in a neutral position. The position of the head should not be tilted forward or backward.  · When in bed, try to find a position of comfort. A firm mattress is best. Try lying flat on your back with pillows under your knees. You can also try lying on your side with your knees bent up towards your chest and a pillow between your knees.  · At first, don't try to stretch out the sore spots. If there is a strain, it's not like the good soreness you get after exercising without an injury. In this case, stretching may make it worse.  · Don't sit for long periods, as in long car rides or other travel. This puts more stress on the lower back than standing or walking.  · During the first 24 to 72 hours after an injury, apply an ice pack to the painful area for 20 minutes and then remove it for 20 minutes over a period of 60 to 90 minutes or several times a day.   · You can alternate ice and heat therapies. Talk with your healthcare provider about the best treatment for your back or neck pain. As a safety precaution, don't use a heating pad at bedtime. Sleeping with a heating pad can lead to skin burns or tissue damage.  · Therapeutic massage can help relax the back and neck muscles without stretching them.  · Be aware of safe lifting methods and don't lift anything over 15 pounds until all the pain is gone.    Medicines  Talk to your healthcare provider before using medicine, especially if you have other medical problems or are taking other medicines.   · You may use over-the-counter medicine to control pain, unless another pain medicine was prescribed. Talk with your doctor first if you have chronic conditions like  diabetes, liver or kidney disease, stomach ulcers, gastrointestinal bleeding, or are taking blood thinner medicines.  · Be careful if you are given pain medicines, narcotics, or medicine for muscle spasm. They can cause drowsiness, and can affect your coordination, reflexes, and judgment. Don't drive or operate heavy machinery.  Follow-up care  Follow up with your healthcare provider, or as advised. You may need physical therapy or further tests.   If X-rays were taken, you will be told of any new findings that may affect your care.   Call 911  Call 911 if any of the following occur:   · Trouble breathing  · Confusion  · Very drowsy or trouble awakening  · Fainting or loss of consciousness  · Rapid or very slow heart rate  · Loss of bowel or bladder control  When to seek medical advice  Call your healthcare provider right away if any of these occur:  · Pain becomes worse or spreads into your arms or legs  · Weakness, numbness or pain in one or both arms or legs  · Numbness in the groin area  · Trouble walking  · Fever of 100.4ºF (38ºC) or higher, or as directed by your healthcare provider  [employer] last reviewed this educational content on 10/1/2019  © 4210-0810 [employer]. All rights reserved. This information is not intended as a substitute for professional medical care. Always follow your healthcare professional's instructions.            obese/UNKEMPT

## 2023-04-03 ENCOUNTER — APPOINTMENT (OUTPATIENT)
Dept: RHEUMATOLOGY | Facility: CLINIC | Age: 68
End: 2023-04-03

## 2023-04-17 ENCOUNTER — APPOINTMENT (OUTPATIENT)
Dept: RHEUMATOLOGY | Facility: CLINIC | Age: 68
End: 2023-04-17